# Patient Record
Sex: MALE | Race: WHITE | NOT HISPANIC OR LATINO | Employment: PART TIME | URBAN - METROPOLITAN AREA
[De-identification: names, ages, dates, MRNs, and addresses within clinical notes are randomized per-mention and may not be internally consistent; named-entity substitution may affect disease eponyms.]

---

## 2023-08-16 ENCOUNTER — OFFICE VISIT (OUTPATIENT)
Dept: URGENT CARE | Facility: CLINIC | Age: 73
End: 2023-08-16
Payer: MEDICARE

## 2023-08-16 VITALS
TEMPERATURE: 97 F | WEIGHT: 219 LBS | BODY MASS INDEX: 29.66 KG/M2 | HEIGHT: 72 IN | HEART RATE: 63 BPM | DIASTOLIC BLOOD PRESSURE: 80 MMHG | SYSTOLIC BLOOD PRESSURE: 140 MMHG | RESPIRATION RATE: 18 BRPM | OXYGEN SATURATION: 98 %

## 2023-08-16 DIAGNOSIS — N18.31 STAGE 3A CHRONIC KIDNEY DISEASE: ICD-10-CM

## 2023-08-16 DIAGNOSIS — U07.1 COVID-19 VIRUS DETECTED: ICD-10-CM

## 2023-08-16 DIAGNOSIS — J02.9 SORE THROAT: ICD-10-CM

## 2023-08-16 DIAGNOSIS — R50.9 SUBJECTIVE FEVER: ICD-10-CM

## 2023-08-16 DIAGNOSIS — U07.1 COVID: Primary | ICD-10-CM

## 2023-08-16 DIAGNOSIS — Z20.822 CONTACT WITH AND (SUSPECTED) EXPOSURE TO COVID-19: ICD-10-CM

## 2023-08-16 LAB
CTP QC/QA: YES
SARS-COV-2 AG RESP QL IA.RAPID: POSITIVE

## 2023-08-16 PROCEDURE — 87811 SARS-COV-2 COVID19 W/OPTIC: CPT | Mod: QW,S$GLB,, | Performed by: NURSE PRACTITIONER

## 2023-08-16 PROCEDURE — 99204 PR OFFICE/OUTPT VISIT, NEW, LEVL IV, 45-59 MIN: ICD-10-PCS | Mod: S$GLB,,, | Performed by: NURSE PRACTITIONER

## 2023-08-16 PROCEDURE — 99204 OFFICE O/P NEW MOD 45 MIN: CPT | Mod: S$GLB,,, | Performed by: NURSE PRACTITIONER

## 2023-08-16 PROCEDURE — 87811 SARS CORONAVIRUS 2 ANTIGEN POCT, MANUAL READ: ICD-10-PCS | Mod: QW,S$GLB,, | Performed by: NURSE PRACTITIONER

## 2023-08-16 RX ORDER — FUROSEMIDE 40 MG/1
40 TABLET ORAL DAILY PRN
COMMUNITY
Start: 2023-08-12

## 2023-08-16 RX ORDER — PREDNISONE 20 MG/1
40 TABLET ORAL
COMMUNITY
Start: 2023-07-11

## 2023-08-16 RX ORDER — METOPROLOL TARTRATE 25 MG/1
25 TABLET, FILM COATED ORAL DAILY
COMMUNITY
Start: 2023-06-16

## 2023-08-16 RX ORDER — ASPIRIN 81 MG/1
81 TABLET ORAL DAILY
COMMUNITY

## 2023-08-16 RX ORDER — AMLODIPINE BESYLATE 5 MG/1
5 TABLET ORAL
COMMUNITY
Start: 2023-06-13

## 2023-08-16 RX ORDER — OLMESARTAN MEDOXOMIL 40 MG/1
40 TABLET ORAL
COMMUNITY
Start: 2023-06-13

## 2023-08-16 NOTE — PROGRESS NOTES
Subjective:      Patient ID: Alexis Britton is a 73 y.o. male.    Vitals:  height is 6' (1.829 m) and weight is 99.3 kg (219 lb). His tympanic temperature is 97.2 °F (36.2 °C). His blood pressure is 140/80 (abnormal) and his pulse is 63. His respiration is 18 and oxygen saturation is 98%.     Chief Complaint: Sore Throat    73 year old male presents for evaluation of scratchy throat and feeling feverish feeling since this morning. At home Covid test (+). Would like a confirmation test to be done. Reports positive contacts with Covid.  No OTC medications taken for relief.     Sore Throat   This is a new problem. The current episode started today. The problem has been gradually worsening. Neither side of throat is experiencing more pain than the other. There has been no fever. The pain is at a severity of 0/10. The patient is experiencing no pain. Pertinent negatives include no abdominal pain, congestion, coughing, diarrhea, drooling, ear discharge, ear pain, headaches, hoarse voice, plugged ear sensation, neck pain, shortness of breath, stridor, swollen glands, trouble swallowing or vomiting. He has had no exposure to strep or mono. He has tried nothing for the symptoms.       Constitution: Positive for fever (subjective). Negative for fatigue.   HENT:  Positive for sore throat (scratchy). Negative for ear pain, ear discharge, drooling, congestion and trouble swallowing.    Neck: neck negative. Negative for neck pain.   Cardiovascular: Negative.    Eyes: Negative.    Respiratory: Negative.  Negative for cough, shortness of breath and stridor.    Gastrointestinal: Negative.  Negative for abdominal pain, vomiting and diarrhea.   Endocrine: negative.   Genitourinary: Negative.    Musculoskeletal: Negative.    Skin: Negative.    Allergic/Immunologic: Negative.    Neurological: Negative.  Negative for headaches.   Hematologic/Lymphatic: Negative.    Psychiatric/Behavioral: Negative.        Objective:     Physical Exam    Constitutional: He is oriented to person, place, and time. He is cooperative.  Non-toxic appearance. He does not appear ill. No distress. normalawake  HENT:   Head: Normocephalic and atraumatic.   Ears:   Right Ear: Hearing, tympanic membrane, external ear and ear canal normal. No cerumen not present. Tympanic membrane is not injected, not scarred, not perforated, not erythematous, not retracted and not bulging. impacted cerumen  Left Ear: Hearing, tympanic membrane, external ear and ear canal normal. No cerumen not present. Tympanic membrane is not injected, not scarred, not perforated, not erythematous, not retracted and not bulging. impacted cerumen  Nose: Mucosal edema present. No rhinorrhea or congestion. Right sinus exhibits no maxillary sinus tenderness and no frontal sinus tenderness. Left sinus exhibits no maxillary sinus tenderness and no frontal sinus tenderness.   Mouth/Throat: Uvula is midline, oropharynx is clear and moist and mucous membranes are normal. Mucous membranes are moist. No oropharyngeal exudate or posterior oropharyngeal erythema. Tonsils are 0 on the right. Tonsils are 0 on the left. No tonsillar exudate.   Eyes: Conjunctivae are normal. Pupils are equal, round, and reactive to light. Right eye exhibits no discharge. Left eye exhibits no discharge. No scleral icterus. Extraocular movement intact   Neck: Neck supple.   Cardiovascular: Normal rate, regular rhythm and normal heart sounds.   Pulmonary/Chest: Effort normal and breath sounds normal. No stridor. No respiratory distress. He has no decreased breath sounds. He has no wheezes. He has no rhonchi. He has no rales. He exhibits no tenderness.   Abdominal: Normal appearance.   Musculoskeletal: Normal range of motion.         General: Normal range of motion.   Neurological: no focal deficit. He is alert and oriented to person, place, and time.   Skin: Skin is warm, dry and not diaphoretic.   Psychiatric: His behavior is normal. Mood,  judgment and thought content normal.   Nursing note and vitals reviewed.    Results for orders placed or performed in visit on 08/16/23   SARS Coronavirus 2 Antigen, POCT Manual Read   Result Value Ref Range    SARS Coronavirus 2 Antigen Positive (A) Negative     Acceptable Yes        Assessment:     1. COVID    2. Sore throat    3. Contact with and (suspected) exposure to covid-19    4. Subjective fever    5. Stage 3a chronic kidney disease        Plan:       Patient presents with symptoms that are consistent with viral illness. Reports (+) Covid exposure and (+) Covid home test. Decision to perform Covid swab to confirm. (+) result discussed. Labs 4/18/23 reviewed. Risks versus benefits of Paxlovid course discussed. Patient desires to initiate treatment with Paxlovid. Decision to prescribe renal dose based on GFR/CKD. Plan is to treat viral infection, manage symptoms and prevent worsening. Discussed with patient who verbalizes understanding.     COVID  -     nirmatrelvir-ritonavir 150-100 mg DsPk; Take 1 tablet by mouth 2 (two) times a day. for 5 days  Dispense: 10 tablet; Refill: 0    Sore throat  -     SARS Coronavirus 2 Antigen, POCT Manual Read    Contact with and (suspected) exposure to covid-19    Subjective fever    Stage 3a chronic kidney disease  -     nirmatrelvir-ritonavir 150-100 mg DsPk; Take 1 tablet by mouth 2 (two) times a day. for 5 days  Dispense: 10 tablet; Refill: 0                Patient Instructions   Rest  Quarantine x 5 days  Hydration/increase fluids  Warm salt water gargles  Warm tea with lemon and honey  Emergen C or Airborne OTC as directed for immune support  Tylenol every 6 hours as needed for comfort  Paxlovid course as directed  Typical course and duration of illness discussed  Signs and symptoms of worsening discussed  Follow up as needed/with worsening